# Patient Record
Sex: FEMALE | Race: WHITE | Employment: OTHER | URBAN - METROPOLITAN AREA
[De-identification: names, ages, dates, MRNs, and addresses within clinical notes are randomized per-mention and may not be internally consistent; named-entity substitution may affect disease eponyms.]

---

## 2021-11-08 ENCOUNTER — TELEPHONE (OUTPATIENT)
Dept: GASTROENTEROLOGY | Age: 86
End: 2021-11-08

## 2021-11-08 NOTE — TELEPHONE ENCOUNTER
Pt's PCP called to see if we had any doctors that would see a pt from a referral. PCP stated they have called other GI offices, and nothing is available until after the 1st of the year. I let the PCP know we have openings for NP. PCP is faxing a referral over to us so the pt can be ankit ASAP.

## 2021-11-09 ENCOUNTER — TELEPHONE (OUTPATIENT)
Dept: GASTROENTEROLOGY | Age: 86
End: 2021-11-09

## 2021-11-10 NOTE — TELEPHONE ENCOUNTER
Scheduled with the patient and daughter Avinash Barrera for 11/23/21 1:15 pm at the [facility] office. Writer thanked and call ended. Wanted to wait at least a week.

## 2021-11-23 ENCOUNTER — OFFICE VISIT (OUTPATIENT)
Dept: GASTROENTEROLOGY | Age: 86
End: 2021-11-23
Payer: MEDICARE

## 2021-11-23 ENCOUNTER — HOSPITAL ENCOUNTER (OUTPATIENT)
Age: 86
Setting detail: SPECIMEN
Discharge: HOME | End: 2021-11-23

## 2021-11-23 VITALS — SYSTOLIC BLOOD PRESSURE: 117 MMHG | DIASTOLIC BLOOD PRESSURE: 62 MMHG | WEIGHT: 164 LBS

## 2021-11-23 DIAGNOSIS — R63.4 WEIGHT LOSS: Primary | ICD-10-CM

## 2021-11-23 DIAGNOSIS — R63.4 WEIGHT LOSS: ICD-10-CM

## 2021-11-23 DIAGNOSIS — R97.8 OTHER ABNORMAL TUMOR MARKERS: ICD-10-CM

## 2021-11-23 DIAGNOSIS — R11.0 NAUSEA: ICD-10-CM

## 2021-11-23 DIAGNOSIS — M83.2 ADULT OSTEOMALACIA DUE TO MALABSORPTION: ICD-10-CM

## 2021-11-23 DIAGNOSIS — R79.9 ABNORMAL FINDING OF BLOOD CHEMISTRY, UNSPECIFIED: ICD-10-CM

## 2021-11-23 PROBLEM — I35.0 AORTIC STENOSIS: Status: ACTIVE | Noted: 2021-11-23

## 2021-11-23 PROBLEM — Z95.2 HISTORY OF AORTIC VALVE REPLACEMENT: Status: ACTIVE | Noted: 2021-11-23

## 2021-11-23 PROBLEM — R42 DIZZINESS: Status: ACTIVE | Noted: 2020-01-28

## 2021-11-23 PROBLEM — I48.91 ATRIAL FIBRILLATION (HCC): Status: ACTIVE | Noted: 2019-07-22

## 2021-11-23 PROBLEM — I25.10 CORONARY ATHEROSCLEROSIS: Status: ACTIVE | Noted: 2021-11-23

## 2021-11-23 PROBLEM — E78.5 HYPERLIPIDEMIA: Status: ACTIVE | Noted: 2021-11-23

## 2021-11-23 PROBLEM — R09.89 CAROTID BRUIT: Status: ACTIVE | Noted: 2021-11-23

## 2021-11-23 LAB
ABSOLUTE EOS #: 0.18 K/UL (ref 0–0.44)
ABSOLUTE IMMATURE GRANULOCYTE: 0.04 K/UL (ref 0–0.3)
ABSOLUTE LYMPH #: 2.1 K/UL (ref 1.1–3.7)
ABSOLUTE MONO #: 0.81 K/UL (ref 0.1–1.2)
BASOPHILS # BLD: 1 % (ref 0–2)
BASOPHILS ABSOLUTE: 0.06 K/UL (ref 0–0.2)
CA 19-9: 22 U/ML (ref 0–35)
DIFFERENTIAL TYPE: ABNORMAL
EOSINOPHILS RELATIVE PERCENT: 2 % (ref 1–4)
FOLATE: 16.6 NG/ML
HCT VFR BLD CALC: 36.5 % (ref 36.3–47.1)
HEMOGLOBIN: 11.4 G/DL (ref 11.9–15.1)
IMMATURE GRANULOCYTES: 0 %
IRON SATURATION: 27 % (ref 20–55)
IRON: 74 UG/DL (ref 37–145)
LYMPHOCYTES # BLD: 23 % (ref 24–43)
MCH RBC QN AUTO: 31.3 PG (ref 25.2–33.5)
MCHC RBC AUTO-ENTMCNC: 31.2 G/DL (ref 28.4–34.8)
MCV RBC AUTO: 100.3 FL (ref 82.6–102.9)
MONOCYTES # BLD: 9 % (ref 3–12)
NRBC AUTOMATED: 0 PER 100 WBC
PDW BLD-RTO: 14.1 % (ref 11.8–14.4)
PLATELET # BLD: 247 K/UL (ref 138–453)
PLATELET ESTIMATE: ABNORMAL
PMV BLD AUTO: 11 FL (ref 8.1–13.5)
RBC # BLD: 3.64 M/UL (ref 3.95–5.11)
RBC # BLD: ABNORMAL 10*6/UL
SEG NEUTROPHILS: 65 % (ref 36–65)
SEGMENTED NEUTROPHILS ABSOLUTE COUNT: 5.86 K/UL (ref 1.5–8.1)
TOTAL IRON BINDING CAPACITY: 279 UG/DL (ref 250–450)
UNSATURATED IRON BINDING CAPACITY: 205 UG/DL (ref 112–347)
VITAMIN B-12: 413 PG/ML (ref 232–1245)
VITAMIN D 25-HYDROXY: 24.7 NG/ML (ref 30–100)
WBC # BLD: 9.1 K/UL (ref 3.5–11.3)
WBC # BLD: ABNORMAL 10*3/UL

## 2021-11-23 PROCEDURE — G8421 BMI NOT CALCULATED: HCPCS | Performed by: INTERNAL MEDICINE

## 2021-11-23 PROCEDURE — 1090F PRES/ABSN URINE INCON ASSESS: CPT | Performed by: INTERNAL MEDICINE

## 2021-11-23 PROCEDURE — 4040F PNEUMOC VAC/ADMIN/RCVD: CPT | Performed by: INTERNAL MEDICINE

## 2021-11-23 PROCEDURE — G8427 DOCREV CUR MEDS BY ELIG CLIN: HCPCS | Performed by: INTERNAL MEDICINE

## 2021-11-23 PROCEDURE — 1036F TOBACCO NON-USER: CPT | Performed by: INTERNAL MEDICINE

## 2021-11-23 PROCEDURE — 99203 OFFICE O/P NEW LOW 30 MIN: CPT | Performed by: INTERNAL MEDICINE

## 2021-11-23 PROCEDURE — G8484 FLU IMMUNIZE NO ADMIN: HCPCS | Performed by: INTERNAL MEDICINE

## 2021-11-23 PROCEDURE — 1123F ACP DISCUSS/DSCN MKR DOCD: CPT | Performed by: INTERNAL MEDICINE

## 2021-11-23 RX ORDER — METOPROLOL TARTRATE 50 MG/1
50 TABLET, FILM COATED ORAL 2 TIMES DAILY
COMMUNITY
Start: 2021-08-31

## 2021-11-23 RX ORDER — ASPIRIN 81 MG/1
TABLET ORAL DAILY
COMMUNITY

## 2021-11-23 RX ORDER — DOCUSATE SODIUM 100 MG/1
100 CAPSULE, LIQUID FILLED ORAL 2 TIMES DAILY
Qty: 60 CAPSULE | Refills: 0 | Status: SHIPPED | OUTPATIENT
Start: 2021-11-23 | End: 2021-12-22

## 2021-11-23 RX ORDER — FUROSEMIDE 40 MG/1
TABLET ORAL
COMMUNITY
Start: 2021-09-13

## 2021-11-23 RX ORDER — ROSUVASTATIN CALCIUM 10 MG/1
TABLET, COATED ORAL
COMMUNITY
Start: 2021-09-13

## 2021-11-23 ASSESSMENT — ENCOUNTER SYMPTOMS
CONSTIPATION: 1
NAUSEA: 1
RESPIRATORY NEGATIVE: 1
ALLERGIC/IMMUNOLOGIC NEGATIVE: 1
EYES NEGATIVE: 1

## 2021-11-23 NOTE — PROGRESS NOTES
Reason for Referral:  Unintentional weight loss      Alessandro Walker, DO  [address] [facility],  [address]    Chief Complaint   Patient presents with    New Patient     referred for weight loss    Weight Loss     Patient weight loss occured 2 months ago. 7-10 pounds. States she currently has normal appetite. States she has troubles chewing- dental problems. Denies abdominal pain.  Constipation     Currently having a bowel movement every 3-5 days. States this is not normal for her. Formed stools. No black stools.  Nausea     Feels nauseous in the morning when she wakes up. Denies GERD symptoms. HISTORY OF PRESENT ILLNESS: Sanchez Mazariegos is a 80 y.o. female with a past history remarkable for HTN, HL, DM, controlled, type II, CAD s/p CABG, valve replacement, on eliquis, referred for evaluation of unintentional weight loss. Macular degeneration addition for evaluation. Over the course of several weeks the patient reported change in caliber stools with a bowel movements occurring every 1 to 2 days. The patient reports thinner stools that appear to be unusual.  No prior colonoscopy reported by the patient. No recent upper endoscopy reported by the patient. Denies any significant changes in appetite. No dysphagia symptoms reported by the patient. Patient underwent a CT abdomen pelvis by outlying facility where the patient was identified to have pancreatic lesion at the uncinate process. Referred to GI for further evaluation. Smoker: None   Drinking history: None   Abdominal surgeries: none   Prior Colonoscopy: never had colonoscopy   Prior EGD: none   FH of GI issues:  with Colon Ca---       Past Medical,Family, and Social History reviewed and does contribute to the patient presentingcondition. Patient's PMH/PSH,SH,PSYCH Hx, MEDs, ALLERGIES, and ROS were all reviewed and updated in the appropriate sections. PAST MEDICAL HISTORY:  History reviewed.  No pertinent past medical history. History reviewed. No pertinent surgical history. CURRENT MEDICATIONS:    Current Outpatient Medications:     apixaban (ELIQUIS) 5 MG TABS tablet, Take 5 mg by mouth 2 times daily, Disp: , Rfl:     furosemide (LASIX) 40 MG tablet, , Disp: , Rfl:     metoprolol tartrate (LOPRESSOR) 50 MG tablet, Take 50 mg by mouth 2 times daily, Disp: , Rfl:     rosuvastatin (CRESTOR) 10 MG tablet, , Disp: , Rfl:     aspirin 81 MG EC tablet, Take by mouth daily, Disp: , Rfl:     ALLERGIES:   Allergies   Allergen Reactions    Ace Inhibitors      Other reaction(s): Cough  Other reaction(s): Captopril-cough      Atorvastatin      Other reaction(s): Lipitor-muscle aches    Morphine      Other reaction(s): Intolerance-unknown    Timolol Maleate      Other reaction(s): Intolerance-unknown       FAMILY HISTORY: History reviewed. No pertinent family history. SOCIAL HISTORY:   Social History     Socioeconomic History    Marital status:      Spouse name: Not on file    Number of children: Not on file    Years of education: Not on file    Highest education level: Not on file   Occupational History    Not on file   Tobacco Use    Smoking status: Never Smoker    Smokeless tobacco: Never Used   Substance and Sexual Activity    Alcohol use: Not Currently    Drug use: Not Currently    Sexual activity: Not on file   Other Topics Concern    Not on file   Social History Narrative    Not on file     Social Determinants of Health     Financial Resource Strain:     Difficulty of Paying Living Expenses: Not on file   Food Insecurity:     Worried About Running Out of Food in the Last Year: Not on file    Manpreet of Food in the Last Year: Not on file   Transportation Needs:     Lack of Transportation (Medical): Not on file    Lack of Transportation (Non-Medical):  Not on file   Physical Activity:     Days of Exercise per Week: Not on file    Minutes of Exercise per Session: Not on file   Stress:     Feeling of Stress : Not on file   Social Connections:     Frequency of Communication with Friends and Family: Not on file    Frequency of Social Gatherings with Friends and Family: Not on file    Attends Jewish Services: Not on file    Active Member of Clubs or Organizations: Not on file    Attends Club or Organization Meetings: Not on file    Marital Status: Not on file   Intimate Partner Violence:     Fear of Current or Ex-Partner: Not on file    Emotionally Abused: Not on file    Physically Abused: Not on file    Sexually Abused: Not on file   Housing Stability:     Unable to Pay for Housing in the Last Year: Not on file    Number of [place] in the Last Year: Not on file    Unstable Housing in the Last Year: Not on file         REVIEW OF SYSTEMS: A 12-point review of systems was obtained and pertinent positives and negatives were listed below. REVIEW OF SYSTEMS:     Constitutional: No fever, no chills, no lethargy, no weakness. HEENT:  No headache, otalgia, itchy eyes, nasal discharge or sore throat. Cardiac:  No chest pain, dyspnea, orthopnea or PND. Chest:   No cough, phlegm or wheezing. Abdomen:      Detailed by MA   Neuro:  No focal weakness, abnormal movements or seizure like activity. Skin:   No rashes, no itching. :   No hematuria, no pyuria, no dysuria, no flank pain. Extremities:  No swelling or joint pains. ROS was otherwise negative    Review of Systems   Constitutional: Positive for unexpected weight change. HENT: Negative. Eyes: Negative. Respiratory: Negative. Cardiovascular: Negative. Gastrointestinal: Positive for constipation and nausea. Endocrine: Negative. Genitourinary: Negative. Musculoskeletal: Positive for gait problem. Skin: Negative. Allergic/Immunologic: Negative. Hematological: Bruises/bleeds easily. Psychiatric/Behavioral: Negative. All other systems reviewed and are negative.       PHYSICAL EXAMINATION: Vital signs reviewed per the nursing documentation. /62   Wt 164 lb (74.4 kg)   There is no height or weight on file to calculate BMI. Physical Exam    Physical Exam   Constitutional: Patient is oriented to person, place, and time. Patient appears well-developed and well-nourished. HENT:   Head: Normocephalic and atraumatic. Eyes: Pupils are equal, round, and reactive to light. EOM are normal.   Neck: Normal range of motion. Neck supple. No JVD present. No tracheal deviation present. No thyromegaly present. Cardiovascular: Normal rate, regular rhythm, normal heart sounds and intact distal pulses. Pulmonary/Chest: Effort normal and breath sounds normal. No stridor. No respiratory distress. He has no wheezes. He has no rales. He exhibits no tenderness. Abdominal: Soft. Bowel sounds are normal. He exhibits no distension and no mass. There is no tenderness. There is no rebound and no guarding. No hernia. Musculoskeletal: Normal range of motion. Lymphadenopathy:    Patient has no cervical adenopathy. Neurological: Patient is alert and oriented to person, place, and time. Psychiatric: Patient has a normal mood and affect. Patient behavior is normal.       LABORATORY DATA: Reviewed  No results found for: WBC, HGB, HCT, MCV, PLT, NA, K, CL, CO2, BUN, CREATININE, LABPROT, LABALBU, GGT, BILITOT, ALKPHOS, AST, ALT, INR      No results found for: RBC, HGB, MCV, MCH, MCHC, RDW, MPV, BASOPCT, LYMPHSABS, MONOSABS, NEUTROABS, EOSABS, BASOSABS      DIAGNOSTIC TESTING:     No results found. IMPRESSION:  Jessica Rogers is a 80 y.o. female with a past history remarkable for HTN, HL, DM, controlled, type II, CAD s/p CABG, valve replacement, on eliquis, referred for evaluation of unintentional weight loss. Macular degeneration addition for evaluation. Over the course of several weeks the patient reported change in caliber stools with a bowel movements occurring every 1 to 2 days.   The patient reports thinner stools that appear to be unusual.  No prior colonoscopy reported by the patient. No recent upper endoscopy reported by the patient. Denies any significant changes in appetite. No dysphagia symptoms reported by the patient. Patient underwent a CT abdomen pelvis by outlying facility where the patient was identified to have pancreatic lesion at the uncinate process. Referred to GI for further evaluation. Assessment  1. Weight loss    2. Nausea        PLAN:    1) pancreatic lesion measuring approximately 1.5 cm at the uncinate process-no mention of pancreatic duct dilatation or biliary ductal dilatation. Will send tumor markers including CA 19-9, and will coordinate an endoscopic ultrasound. Will need to obtain images from outlying facility to review. 2) caloric and nutritional optimization recommended-we will send for vitamin serologies, vitamin D, B12, folate, iron levels. Multivitamins and vitamin D supplementation recommended    3) we will coordinate endoscopic ultrasound to further evaluate lesion. Follow-up after procedure. Thank you for allowing me to participate in the care of Ms. Drew Manley. For any further questions please do not hesitate to contact me. I have reviewed and agree with the MA/LPN ROS please refer to their documentation from today's encounter on a separate note. Billy Gordon MD, MPH   [facility]  Office #: [phone]          this note is created with the assistance of a speech recognition program.  While intending to generate a document that actually reflects the content of the visit, the document can still have some errors including those of syntax and sound a like substitutions which may escape proof reading. It such instances, actual meaning can be extrapolated by contextual diversion.

## 2021-12-06 ENCOUNTER — PATIENT MESSAGE (OUTPATIENT)
Dept: GASTROENTEROLOGY | Age: 86
End: 2021-12-06

## 2021-12-06 ENCOUNTER — TELEPHONE (OUTPATIENT)
Dept: GASTROENTEROLOGY | Age: 86
End: 2021-12-06

## 2021-12-06 DIAGNOSIS — K86.9 PANCREATIC LESION: Primary | ICD-10-CM

## 2021-12-06 RX ORDER — ONDANSETRON 4 MG/1
4 TABLET, ORALLY DISINTEGRATING ORAL EVERY 8 HOURS PRN
Qty: 30 TABLET | Refills: 0 | Status: SHIPPED | OUTPATIENT
Start: 2021-12-06 | End: 2021-12-07 | Stop reason: SDUPTHER

## 2021-12-06 NOTE — TELEPHONE ENCOUNTER
Called pt; spoke to Zeinab Hayes, daughter. Pt now scheduled for STV 12/16/21 at 1130am, EUS with Dr Mary Jo Taylor, vacc, em, clearance sent to Dr Opal Vilchis for eliquis. Mayank Rodriguez, pt's f/u appt with Dr Saleem Martinez is 12/14/21, 2 days after Dr Grant Marques first availability for EUS. Daughter would like to know if pt should keep the 12/14/21 appt.

## 2021-12-07 DIAGNOSIS — R11.0 NAUSEA: Primary | ICD-10-CM

## 2021-12-07 RX ORDER — ONDANSETRON 4 MG/1
4 TABLET, ORALLY DISINTEGRATING ORAL EVERY 8 HOURS PRN
Qty: 30 TABLET | Refills: 0 | Status: SHIPPED | OUTPATIENT
Start: 2021-12-07

## 2021-12-16 ENCOUNTER — ANESTHESIA (OUTPATIENT)
Dept: ENDOSCOPY | Age: 86
End: 2021-12-16
Payer: MEDICARE

## 2021-12-16 ENCOUNTER — ANESTHESIA EVENT (OUTPATIENT)
Dept: ENDOSCOPY | Age: 86
End: 2021-12-16
Payer: MEDICARE

## 2021-12-16 ENCOUNTER — HOSPITAL ENCOUNTER (OUTPATIENT)
Age: 86
Setting detail: OUTPATIENT SURGERY
Discharge: HOME | End: 2021-12-16
Attending: INTERNAL MEDICINE | Admitting: INTERNAL MEDICINE
Payer: MEDICARE

## 2021-12-16 ENCOUNTER — HOSPITAL ENCOUNTER (OUTPATIENT)
Dept: ULTRASOUND IMAGING | Age: 86
Discharge: HOME | End: 2021-12-18
Attending: INTERNAL MEDICINE
Payer: MEDICARE

## 2021-12-16 VITALS
DIASTOLIC BLOOD PRESSURE: 34 MMHG | OXYGEN SATURATION: 100 % | SYSTOLIC BLOOD PRESSURE: 112 MMHG | RESPIRATION RATE: 30 BRPM

## 2021-12-16 VITALS
BODY MASS INDEX: 25.71 KG/M2 | HEIGHT: 66 IN | DIASTOLIC BLOOD PRESSURE: 53 MMHG | TEMPERATURE: 97.3 F | SYSTOLIC BLOOD PRESSURE: 141 MMHG | RESPIRATION RATE: 22 BRPM | HEART RATE: 76 BPM | OXYGEN SATURATION: 100 % | WEIGHT: 160 LBS

## 2021-12-16 DIAGNOSIS — R10.84 ABDOMINAL PAIN, GENERALIZED: ICD-10-CM

## 2021-12-16 PROCEDURE — 43259 EGD US EXAM DUODENUM/JEJUNUM: CPT | Performed by: INTERNAL MEDICINE

## 2021-12-16 PROCEDURE — 3700000001 HC ADD 15 MINUTES (ANESTHESIA): Performed by: INTERNAL MEDICINE

## 2021-12-16 PROCEDURE — 2580000003 HC RX 258: Performed by: INTERNAL MEDICINE

## 2021-12-16 PROCEDURE — 2709999900 HC NON-CHARGEABLE SUPPLY: Performed by: INTERNAL MEDICINE

## 2021-12-16 PROCEDURE — 2500000003 HC RX 250 WO HCPCS: Performed by: SPECIALIST

## 2021-12-16 PROCEDURE — 7100000011 HC PHASE II RECOVERY - ADDTL 15 MIN: Performed by: INTERNAL MEDICINE

## 2021-12-16 PROCEDURE — 3609018500 HC EGD US SCOPE W/ADJACENT STRUCTURES: Performed by: INTERNAL MEDICINE

## 2021-12-16 PROCEDURE — 3700000000 HC ANESTHESIA ATTENDED CARE: Performed by: INTERNAL MEDICINE

## 2021-12-16 PROCEDURE — 7100000010 HC PHASE II RECOVERY - FIRST 15 MIN: Performed by: INTERNAL MEDICINE

## 2021-12-16 PROCEDURE — 6360000002 HC RX W HCPCS: Performed by: SPECIALIST

## 2021-12-16 PROCEDURE — 76975 GI ENDOSCOPIC ULTRASOUND: CPT

## 2021-12-16 RX ORDER — PROPOFOL 10 MG/ML
INJECTION, EMULSION INTRAVENOUS CONTINUOUS PRN
Status: DISCONTINUED | OUTPATIENT
Start: 2021-12-16 | End: 2021-12-16 | Stop reason: SDUPTHER

## 2021-12-16 RX ORDER — SODIUM CHLORIDE 9 MG/ML
INJECTION, SOLUTION INTRAVENOUS CONTINUOUS
Status: DISCONTINUED | OUTPATIENT
Start: 2021-12-16 | End: 2021-12-16 | Stop reason: HOSPADM

## 2021-12-16 RX ORDER — LIDOCAINE HYDROCHLORIDE 10 MG/ML
INJECTION, SOLUTION EPIDURAL; INFILTRATION; INTRACAUDAL; PERINEURAL PRN
Status: DISCONTINUED | OUTPATIENT
Start: 2021-12-16 | End: 2021-12-16 | Stop reason: SDUPTHER

## 2021-12-16 RX ADMIN — PROPOFOL 80 MCG/KG/MIN: 10 INJECTION, EMULSION INTRAVENOUS at 13:23

## 2021-12-16 RX ADMIN — LIDOCAINE HYDROCHLORIDE 30 MG: 10 INJECTION, SOLUTION EPIDURAL; INFILTRATION; INTRACAUDAL; PERINEURAL at 13:23

## 2021-12-16 RX ADMIN — SODIUM CHLORIDE: 9 INJECTION, SOLUTION INTRAVENOUS at 11:39

## 2021-12-16 ASSESSMENT — PAIN SCALES - GENERAL: PAINLEVEL_OUTOF10: 0

## 2021-12-16 ASSESSMENT — PAIN - FUNCTIONAL ASSESSMENT: PAIN_FUNCTIONAL_ASSESSMENT: 0-10

## 2021-12-16 NOTE — OP NOTE
EGD/EUS      Patient:   Solomon Shah    :    1926    Facility:   [address] [place] [address]   Referring/PCP: Arnav Lance DO    Procedure:   Endoscopic ultrasound   Date:     2021   Endoscopist:  Cece Cisse MD, [facility]    Indication:   Abnormal CT showing cyst in the pancreas    Procedure diagnosis: Cyst in the uncinate process without any worrisome features, common bile duct stone, Schatzki ring, hiatal hernia    Anesthesia:  MAC    Complications: None    Specimen: None    Description of Procedure:  Informed consent was obtained from the patient after explanation of the procedure including indications, description of the procedure,  benefits and possible risks and complications of the procedure, and alternatives. Questions were answered. The patient's history was reviewed and a directed physical examination was performed prior to the procedure. Patient was monitored throughout the procedure with pulse oximetry and periodic assessment of vital signs. Patient was sedated as noted above. With the patient in the left lateral decubitus position, the Olympus videoendoscope followed by endoechoendoscope was placed in the patient's mouth and under direct visualization passed into the esophagus. The scope was passed to the 2nd portion of the duodenum. The patient tolerated the procedure well and was taken to the recovery area in good condition. Estimated blood loss was none. EGD Findings[de-identified]   Esophagus: There was a nonobstructive Schatzki ring in the lower esophagus. The esophagus otherwise appeared normal.  Stomach: Stomach had small sliding hiatal hernia. Stomach was otherwise normal.  Duodenum: normal    EUS findings:  Pancreas: There was an anechoic cyst in the uncinate process measuring about 9 mm in diameter without any worrisome features. The pancreas otherwise appeared normal. Normal PD. No signs of chronic pancreatitis.    CBD: There was a hyperechoic stone with shadowing in the distal common bile duct measuring 8 mm in diameter. The rest of the common bile duct otherwise appeared normal.  CBD diameter was 7 mm. Gallbladder: Gallbladder had at least 1 hyperechoic stone with shadowing. Ampulla: Normal.  Left lobe of liver: normal.   Lymphadenopathy: No pathologic lymphadenopathy seen in upper abdomen. Recommendations: Schedule for ERCP for stone extraction. No further work-up is required for pancreatic cyst given the patient's age.     Electronically signed by Baldo Flor MD, FACG  on 12/16/2021 at 2:11 PM

## 2021-12-16 NOTE — ANESTHESIA POSTPROCEDURE EVALUATION
Department of Anesthesiology  Postprocedure Note    Patient: Pj Cooper  MRN: [id number]  YOB: 1926  Date of evaluation: 12/16/2021  Time:  3:34 PM     Procedure Summary     Date: 12/16/21 Room / Location: [address] [place] [address]    Anesthesia Start: 6808 Anesthesia Stop: 0144    Procedure: ENDOSCOPIC ULTRASOUND (N/A ) Diagnosis: (PANCREATIC LESION)    Surgeons: Carter Talbot MD Responsible Provider: Bhavana Lay MD    Anesthesia Type: MAC, TIVA ASA Status: 3          Anesthesia Type: MAC, TIVA    Ruiz Phase I: Ruiz Score: 10    Ruiz Phase II: Ruiz Score: 10    Last vitals: Reviewed and per EMR flowsheets.        Anesthesia Post Evaluation    Patient location during evaluation: PACU  Patient participation: complete - patient participated  Level of consciousness: awake and alert  Pain score: 0  Airway patency: patent  Nausea & Vomiting: no nausea and no vomiting  Complications: no  Cardiovascular status: hemodynamically stable  Respiratory status: room air  Hydration status: euvolemic

## 2021-12-16 NOTE — H&P
History and Physical    Pt Name: Joshua Griffin  MRN: [id number]  YOB: 1926  Date of evaluation: 12/16/2021  Primary Care Physician: Rachna Guerra DO    SUBJECTIVE:   History of Chief Complaint:    Joshua Griffin is a 80 y.o. female who is scheduled today for EUS. She is present with her daughter today. Pt complains of \"I have nausea\" and reportedly has 15 lb unintentional weight loss in 4 months, and that CT imaging revealed a pancreatic lesion. Allergies  is allergic to ace inhibitors, atorvastatin, morphine, and timolol maleate. Medications  Prior to Admission medications    Medication Sig Start Date End Date Taking? Authorizing Provider   metFORMIN (GLUCOPHAGE) 500 MG tablet Take 500 mg by mouth 2 times daily (with meals)   Yes Historical Provider, MD   ondansetron (ZOFRAN ODT) 4 MG disintegrating tablet Place 1 tablet under the tongue every 8 hours as needed for Nausea or Vomiting 12/7/21  Yes Debbie Conrad MD   apixaban (ELIQUIS) 5 MG TABS tablet Take 5 mg by mouth 2 times daily 3/19/21  Yes Historical Provider, MD   furosemide (LASIX) 40 MG tablet  9/13/21  Yes Historical Provider, MD   metoprolol tartrate (LOPRESSOR) 50 MG tablet Take 50 mg by mouth 2 times daily 8/31/21  Yes Historical Provider, MD   rosuvastatin (CRESTOR) 10 MG tablet  9/13/21  Yes Historical Provider, MD   aspirin 81 MG EC tablet Take by mouth daily   Yes Historical Provider, MD   docusate sodium (COLACE) 100 MG capsule Take 1 capsule by mouth 2 times daily 11/23/21 12/23/21 Yes Debbie Conrad MD     Past Medical History    has a past medical history of Aortic stenosis, Aortic valve replaced, Atrial fibrillation ([address] CAD (coronary artery disease), Carotid artery stenosis, COVID-19, Hyperlipidemia, Type 2 diabetes mellitus ([address] Wears glasses, and Wears hearing aid.   Past Surgical History   has a past surgical history that includes Aortic valve replacement (2001); fracture surgery; Coronary angioplasty with stent (2007); and Coronary artery bypass graft (2001). Social History   reports that she has never smoked. She has never used smokeless tobacco.   reports previous alcohol use. reports previous drug use. Marital Status  last month, had been  for 68 years  [de-identified] 5  Occupation retired nurse. Review of Systems:  CONSTITUTIONAL:   negative for fevers, chills, fatigue and malaise    EYES:   negative for double vision, blurred vision and photophobia +glasses   HEENT:   negative for tinnitus, epistaxis and sore throat  +hearing loss   RESPIRATORY:   negative for cough, shortness of breath, wheezing     CARDIOVASCULAR:   negative for chest pain, palpitations, syncope, edema     GASTROINTESTINAL:   See HPI   GENITOURINARY:   negative for incontinence     MUSCULOSKELETAL:   negative for neck or back pain     NEUROLOGICAL:   Negative for weakness and tingling  negative for headaches and dizziness     PSYCHIATRIC:   negative for anxiety       OBJECTIVE:   VITALS:  height is 5' 6\" (1.676 m) and weight is 160 lb (72.6 kg). Her infrared temperature is 98.2 °F (36.8 °C). Her blood pressure is 117/77 and her pulse is 82. Her respiration is 20 and oxygen saturation is 96%. CONSTITUTIONAL:alert & oriented x 3, no acute distress. Calm and pleasant. SKIN:  Warm and dry, no rashes on exposed areas of skin   HEAD:  Normocephalic, atraumatic   EYES: PERRL. EOMs intact. Wearing glasses. EARS:  Equal bilaterally, no edema or thickening, skin is intact without lumps or lesions. No discharge. Hearing loss mild. NOSE:  Nares patent. No rhinorrhea   MOUTH/THROAT:  Mucous membranes moist, tongue is pink, uvula midline, teeth appear to be intact  NECK:supple, full ROM  LUNGS: Respirations even and non-labored. Clear to auscultation bilaterally, no wheezes, rales, or rhonchi. CARDIOVASCULAR: Regular rate and rhythm, no murmurs/rubs/gallops, s/p valve replacement.    ABDOMEN: soft, non-tender, non-distended, bowel sounds active x 4 EXTREMITIES: No edema bilateral lower extremities. No varicosities bilateral lower extremities. NEUROLOGIC: CN II-XII are grossly intact. Gait not assessed.    IMPRESSIONS:   PANCREATIC LESION  PLANS:   KEVIN MILAN CNP   Electronically signed 12/16/2021 at 11:55 AM

## 2021-12-16 NOTE — H&P (VIEW-ONLY)
History and Physical    Pt Name: Harjinder Veliz  MRN: [id number]  YOB: 1926  Date of evaluation: 12/16/2021  Primary Care Physician: Mona Kelsey DO    SUBJECTIVE:   History of Chief Complaint:    Harjinder Veliz is a 80 y.o. female who is scheduled today for EUS. She is present with her daughter today. Pt complains of \"I have nausea\" and reportedly has 15 lb unintentional weight loss in 4 months, and that CT imaging revealed a pancreatic lesion. Allergies  is allergic to ace inhibitors, atorvastatin, morphine, and timolol maleate. Medications  Prior to Admission medications    Medication Sig Start Date End Date Taking? Authorizing Provider   metFORMIN (GLUCOPHAGE) 500 MG tablet Take 500 mg by mouth 2 times daily (with meals)   Yes Historical Provider, MD   ondansetron (ZOFRAN ODT) 4 MG disintegrating tablet Place 1 tablet under the tongue every 8 hours as needed for Nausea or Vomiting 12/7/21  Yes Summer Childs MD   apixaban (ELIQUIS) 5 MG TABS tablet Take 5 mg by mouth 2 times daily 3/19/21  Yes Historical Provider, MD   furosemide (LASIX) 40 MG tablet  9/13/21  Yes Historical Provider, MD   metoprolol tartrate (LOPRESSOR) 50 MG tablet Take 50 mg by mouth 2 times daily 8/31/21  Yes Historical Provider, MD   rosuvastatin (CRESTOR) 10 MG tablet  9/13/21  Yes Historical Provider, MD   aspirin 81 MG EC tablet Take by mouth daily   Yes Historical Provider, MD   docusate sodium (COLACE) 100 MG capsule Take 1 capsule by mouth 2 times daily 11/23/21 12/23/21 Yes Summer Childs MD     Past Medical History    has a past medical history of Aortic stenosis, Aortic valve replaced, Atrial fibrillation ([address] CAD (coronary artery disease), Carotid artery stenosis, COVID-19, Hyperlipidemia, Type 2 diabetes mellitus ([address] Wears glasses, and Wears hearing aid.   Past Surgical History   has a past surgical history that includes Aortic valve replacement (2001); fracture surgery; Coronary angioplasty with stent (2007); and Coronary artery bypass graft (2001). Social History   reports that she has never smoked. She has never used smokeless tobacco.   reports previous alcohol use. reports previous drug use. Marital Status  last month, had been  for 68 years  [de-identified] 5  Occupation retired nurse. Review of Systems:  CONSTITUTIONAL:   negative for fevers, chills, fatigue and malaise    EYES:   negative for double vision, blurred vision and photophobia +glasses   HEENT:   negative for tinnitus, epistaxis and sore throat  +hearing loss   RESPIRATORY:   negative for cough, shortness of breath, wheezing     CARDIOVASCULAR:   negative for chest pain, palpitations, syncope, edema     GASTROINTESTINAL:   See HPI   GENITOURINARY:   negative for incontinence     MUSCULOSKELETAL:   negative for neck or back pain     NEUROLOGICAL:   Negative for weakness and tingling  negative for headaches and dizziness     PSYCHIATRIC:   negative for anxiety       OBJECTIVE:   VITALS:  height is 5' 6\" (1.676 m) and weight is 160 lb (72.6 kg). Her infrared temperature is 98.2 °F (36.8 °C). Her blood pressure is 117/77 and her pulse is 82. Her respiration is 20 and oxygen saturation is 96%. CONSTITUTIONAL:alert & oriented x 3, no acute distress. Calm and pleasant. SKIN:  Warm and dry, no rashes on exposed areas of skin   HEAD:  Normocephalic, atraumatic   EYES: PERRL. EOMs intact. Wearing glasses. EARS:  Equal bilaterally, no edema or thickening, skin is intact without lumps or lesions. No discharge. Hearing loss mild. NOSE:  Nares patent. No rhinorrhea   MOUTH/THROAT:  Mucous membranes moist, tongue is pink, uvula midline, teeth appear to be intact  NECK:supple, full ROM  LUNGS: Respirations even and non-labored. Clear to auscultation bilaterally, no wheezes, rales, or rhonchi. CARDIOVASCULAR: Regular rate and rhythm, no murmurs/rubs/gallops, s/p valve replacement.    ABDOMEN: soft, non-tender, non-distended, bowel sounds active x 4 EXTREMITIES: No edema bilateral lower extremities. No varicosities bilateral lower extremities. NEUROLOGIC: CN II-XII are grossly intact. Gait not assessed.    IMPRESSIONS:   PANCREATIC LESION  PLANS:   KEVIN MILNA CNP   Electronically signed 12/16/2021 at 11:55 AM

## 2021-12-16 NOTE — ANESTHESIA PRE PROCEDURE
Department of Anesthesiology  Preprocedure Note       Name:  Arpan Barrios   Age:  80 y.o.  :  1926                                          MRN:  [id number]         Date:  2021      Surgeon: Alex Bowers):  Nan Gillette MD    Procedure: Procedure(s):  ENDOSCOPIC ULTRASOUND    Medications prior to admission:   Prior to Admission medications    Medication Sig Start Date End Date Taking? Authorizing Provider   metFORMIN (GLUCOPHAGE) 500 MG tablet Take 500 mg by mouth 2 times daily (with meals)   Yes Historical Provider, MD   ondansetron (ZOFRAN ODT) 4 MG disintegrating tablet Place 1 tablet under the tongue every 8 hours as needed for Nausea or Vomiting 21  Yes Lucio Donahue MD   apixaban (ELIQUIS) 5 MG TABS tablet Take 5 mg by mouth 2 times daily 3/19/21  Yes Historical Provider, MD   furosemide (LASIX) 40 MG tablet  21  Yes Historical Provider, MD   metoprolol tartrate (LOPRESSOR) 50 MG tablet Take 50 mg by mouth 2 times daily 21  Yes Historical Provider, MD   rosuvastatin (CRESTOR) 10 MG tablet  21  Yes Historical Provider, MD   aspirin 81 MG EC tablet Take by mouth daily   Yes Historical Provider, MD   docusate sodium (COLACE) 100 MG capsule Take 1 capsule by mouth 2 times daily 21 Yes Lucio Donahue MD       Current medications:    Current Facility-Administered Medications   Medication Dose Route Frequency Provider Last Rate Last Admin    0.9 % sodium chloride infusion   IntraVENous Continuous Nan Gillette MD 20 mL/hr at 21 1139 New Bag at 21 1139       Allergies: Allergies   Allergen Reactions    Ace Inhibitors      Other reaction(s): Cough  Other reaction(s): Captopril-cough      Atorvastatin      Other reaction(s): Lipitor-muscle aches    Morphine      Other reaction(s): Intolerance-unknown    Timolol Maleate      Other reaction(s):  Intolerance-unknown       Problem List:    Patient Active Problem List   Diagnosis Code    Coronary atherosclerosis I25.10    Atrial fibrillation (HCC) I48.91    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    Aortic stenosis I35.0    Carotid bruit R09.89    History of aortic valve replacement Z95.2    History of coronary artery stent placement Z95.5    Hyperlipidemia E78.5    Mitral valve disease I05.9    Dizziness R42       Past Medical History:        Diagnosis Date    Aortic stenosis     Aortic valve replaced     Atrial fibrillation (HCC)     CAD (coronary artery disease)     stents, [facility], notes in care everywhere    Carotid artery stenosis     COVID-19 11/2021    not hospitalized    Hyperlipidemia     Type 2 diabetes mellitus ([facility] [address]     Wears glasses     Wears hearing aid        Past Surgical History:        Procedure Laterality Date    AORTIC VALVE REPLACEMENT  2001    age 79   Nena Se CABG WITH AORTIC VALVE REPLACEMENT  2001    CORONARY ANGIOPLASTY WITH STENT PLACEMENT  2007    FRACTURE SURGERY      multiple over the years, ankle, wrist, shoulder       Social History:    Social History     Tobacco Use    Smoking status: Never Smoker    Smokeless tobacco: Never Used   Substance Use Topics    Alcohol use: Not Currently                                Counseling given: Not Answered      Vital Signs (Current):   Vitals:    12/16/21 1116   BP: 117/77   Pulse: 82   Resp: 20   Temp: 98.2 °F (36.8 °C)   TempSrc: Infrared   SpO2: 96%   Weight: 160 lb (72.6 kg)   Height: 5' 6\" (1.676 m)                                              BP Readings from Last 3 Encounters:   12/16/21 117/77   11/23/21 117/62       NPO Status: Time of last liquid consumption: 2100                        Time of last solid consumption: 1800                        Date of last liquid consumption: 12/16/21                        Date of last solid food consumption: 12/15/21    BMI:   Wt Readings from Last 3 Encounters:   12/16/21 160 lb (72.6 kg)   11/23/21 164 lb (74.4 kg)     Body mass index is 25.82 kg/m². CBC:   Lab Results   Component Value Date    WBC 9.1 11/23/2021    RBC 3.64 11/23/2021    HGB 11.4 11/23/2021    HCT 36.5 11/23/2021    .3 11/23/2021    RDW 14.1 11/23/2021     11/23/2021       CMP: No results found for: NA, K, CL, CO2, BUN, CREATININE, GFRAA, AGRATIO, LABGLOM, GLUCOSE, PROT, CALCIUM, BILITOT, ALKPHOS, AST, ALT    POC Tests: No results for input(s): POCGLU, POCNA, POCK, POCCL, POCBUN, POCHEMO, POCHCT in the last 72 hours. Coags: No results found for: PROTIME, INR, APTT    HCG (If Applicable): No results found for: PREGTESTUR, PREGSERUM, HCG, HCGQUANT     ABGs: No results found for: PHART, PO2ART, QBY1JRV, MUK2AOR, BEART, Z4NWWYDU     Type & Screen (If Applicable):  No results found for: LABABO, LABRH    Drug/Infectious Status (If Applicable):  No results found for: HIV, HEPCAB    COVID-19 Screening (If Applicable): No results found for: COVID19        Anesthesia Evaluation  Patient summary reviewed and Nursing notes reviewed no history of anesthetic complications:   Airway: Mallampati: II  TM distance: >3 FB   Neck ROM: full  Mouth opening: > = 3 FB Dental: normal exam   (+) caps      Pulmonary:normal exam    (+) recent URI:                             Cardiovascular:  Exercise tolerance: good (>4 METS),   (+) valvular problems/murmurs (s/p AVR):, CAD: non-obstructive, hyperlipidemia        Rhythm: regular  Rate: normal           Beta Blocker:  Dose within 24 Hrs         Neuro/Psych:   Negative Neuro/Psych ROS              GI/Hepatic/Renal: Neg GI/Hepatic/Renal ROS            Endo/Other:    (+) DiabetesType II DM, , .                 Abdominal:             Vascular: Other Findings:           Anesthesia Plan      MAC and TIVA     ASA 3       Induction: intravenous. MIPS: Postoperative opioids intended and Prophylactic antiemetics administered. Anesthetic plan and risks discussed with patient.       Plan discussed with CRNA and surgical team.                  Pablo Ren MD   12/16/2021

## 2021-12-22 ENCOUNTER — HOSPITAL ENCOUNTER (OUTPATIENT)
Age: 86
Discharge: HOME | End: 2021-12-22
Payer: MEDICARE

## 2021-12-22 ENCOUNTER — OFFICE VISIT (OUTPATIENT)
Dept: GASTROENTEROLOGY | Age: 86
End: 2021-12-22
Payer: MEDICARE

## 2021-12-22 ENCOUNTER — TELEPHONE (OUTPATIENT)
Dept: GASTROENTEROLOGY | Age: 86
End: 2021-12-22

## 2021-12-22 VITALS — WEIGHT: 160 LBS | SYSTOLIC BLOOD PRESSURE: 113 MMHG | BODY MASS INDEX: 25.82 KG/M2 | DIASTOLIC BLOOD PRESSURE: 64 MMHG

## 2021-12-22 DIAGNOSIS — R11.0 NAUSEA: Primary | ICD-10-CM

## 2021-12-22 DIAGNOSIS — R11.0 NAUSEA: ICD-10-CM

## 2021-12-22 DIAGNOSIS — R63.4 WEIGHT LOSS: ICD-10-CM

## 2021-12-22 DIAGNOSIS — K59.00 CONSTIPATION, UNSPECIFIED CONSTIPATION TYPE: ICD-10-CM

## 2021-12-22 LAB
ABSOLUTE EOS #: 0.1 K/UL (ref 0–0.44)
ABSOLUTE IMMATURE GRANULOCYTE: 0.04 K/UL (ref 0–0.3)
ABSOLUTE LYMPH #: 1.56 K/UL (ref 1.1–3.7)
ABSOLUTE MONO #: 0.62 K/UL (ref 0.1–1.2)
ALBUMIN SERPL-MCNC: 3.7 G/DL (ref 3.5–5.2)
ALBUMIN/GLOBULIN RATIO: 0.9 (ref 1–2.5)
ALP BLD-CCNC: 97 U/L (ref 35–104)
ALT SERPL-CCNC: 11 U/L (ref 5–33)
AST SERPL-CCNC: 14 U/L
BASOPHILS # BLD: 1 % (ref 0–2)
BASOPHILS ABSOLUTE: 0.07 K/UL (ref 0–0.2)
BILIRUB SERPL-MCNC: 0.19 MG/DL (ref 0.3–1.2)
BILIRUBIN DIRECT: <0.08 MG/DL
BILIRUBIN, INDIRECT: ABNORMAL MG/DL (ref 0–1)
DIFFERENTIAL TYPE: ABNORMAL
EOSINOPHILS RELATIVE PERCENT: 1 % (ref 1–4)
GLOBULIN: ABNORMAL G/DL (ref 1.5–3.8)
HCT VFR BLD CALC: 32.5 % (ref 36.3–47.1)
HEMOGLOBIN: 10.5 G/DL (ref 11.9–15.1)
IMMATURE GRANULOCYTES: 1 %
LYMPHOCYTES # BLD: 18 % (ref 24–43)
MCH RBC QN AUTO: 31.8 PG (ref 25.2–33.5)
MCHC RBC AUTO-ENTMCNC: 32.3 G/DL (ref 28.4–34.8)
MCV RBC AUTO: 98.5 FL (ref 82.6–102.9)
MONOCYTES # BLD: 7 % (ref 3–12)
NRBC AUTOMATED: 0 PER 100 WBC
PDW BLD-RTO: 14.4 % (ref 11.8–14.4)
PLATELET # BLD: 273 K/UL (ref 138–453)
PLATELET ESTIMATE: ABNORMAL
PMV BLD AUTO: 11 FL (ref 8.1–13.5)
RBC # BLD: 3.3 M/UL (ref 3.95–5.11)
RBC # BLD: ABNORMAL 10*6/UL
SEG NEUTROPHILS: 72 % (ref 36–65)
SEGMENTED NEUTROPHILS ABSOLUTE COUNT: 6.14 K/UL (ref 1.5–8.1)
TOTAL PROTEIN: 7.6 G/DL (ref 6.4–8.3)
WBC # BLD: 8.5 K/UL (ref 3.5–11.3)
WBC # BLD: ABNORMAL 10*3/UL

## 2021-12-22 PROCEDURE — G8419 CALC BMI OUT NRM PARAM NOF/U: HCPCS | Performed by: INTERNAL MEDICINE

## 2021-12-22 PROCEDURE — 1123F ACP DISCUSS/DSCN MKR DOCD: CPT | Performed by: INTERNAL MEDICINE

## 2021-12-22 PROCEDURE — 1090F PRES/ABSN URINE INCON ASSESS: CPT | Performed by: INTERNAL MEDICINE

## 2021-12-22 PROCEDURE — G8484 FLU IMMUNIZE NO ADMIN: HCPCS | Performed by: INTERNAL MEDICINE

## 2021-12-22 PROCEDURE — 4040F PNEUMOC VAC/ADMIN/RCVD: CPT | Performed by: INTERNAL MEDICINE

## 2021-12-22 PROCEDURE — 80076 HEPATIC FUNCTION PANEL: CPT

## 2021-12-22 PROCEDURE — 85025 COMPLETE CBC W/AUTO DIFF WBC: CPT

## 2021-12-22 PROCEDURE — 1036F TOBACCO NON-USER: CPT | Performed by: INTERNAL MEDICINE

## 2021-12-22 PROCEDURE — 99213 OFFICE O/P EST LOW 20 MIN: CPT | Performed by: INTERNAL MEDICINE

## 2021-12-22 PROCEDURE — G8427 DOCREV CUR MEDS BY ELIG CLIN: HCPCS | Performed by: INTERNAL MEDICINE

## 2021-12-22 PROCEDURE — 36415 COLL VENOUS BLD VENIPUNCTURE: CPT

## 2021-12-22 RX ORDER — POLYETHYLENE GLYCOL 3350 17 G/17G
17 POWDER, FOR SOLUTION ORAL DAILY
Qty: 476 G | Refills: 0 | COMMUNITY
Start: 2021-12-22 | End: 2022-01-21

## 2021-12-22 RX ORDER — DOCUSATE SODIUM 100 MG/1
CAPSULE, LIQUID FILLED ORAL
Qty: 60 CAPSULE | Refills: 0 | Status: SHIPPED | OUTPATIENT
Start: 2021-12-22

## 2021-12-22 ASSESSMENT — ENCOUNTER SYMPTOMS
RESPIRATORY NEGATIVE: 1
NAUSEA: 1
EYES NEGATIVE: 1
ABDOMINAL DISTENTION: 1
CONSTIPATION: 1
ALLERGIC/IMMUNOLOGIC NEGATIVE: 1

## 2021-12-22 NOTE — TELEPHONE ENCOUNTER
Writer unable to schedule procedure today for ERCP due to needing Eliquis clearance. Clearance sent to Dr Roseanna Hurtado. Patient needs to be schedule at [address] . Writer will await clearance and once received patient will be scheduled.

## 2021-12-22 NOTE — PROGRESS NOTES
GI FOLLOW UP    INTERVAL HISTORY:       Ongoing symptoms of constipation  EUS negative for worrisome pancreatic lesion, identified to have choledocholithiasis  CA 19-9 negative      Chief Complaint   Patient presents with    Follow-up     EUS follow up    Constipation     Patient having bowel movement every 5+ days. Last bowel movement was 8 days ago. Taking colace BID. States feeling nauseous and having abdominal discomfort. Rarely she will vomit. 1. Nausea    2. Weight loss    3. Constipation, unspecified constipation type          HISTORY OF PRESENT ILLNESS: Young Carry is a 80 y.o. female with a past history remarkable for HTN, HL, DM, controlled, type II, CAD s/p CABG, valve replacement, on eliquis, referred for evaluation of unintentional weight loss. Macular degeneration addition for evaluation. Over the course of several weeks the patient reported change in caliber stools with a bowel movements occurring every 1 to 2 days. The patient reports thinner stools that appear to be unusual.  No prior colonoscopy reported by the patient. No recent upper endoscopy reported by the patient. Denies any significant changes in appetite. No dysphagia symptoms reported by the patient. Patient underwent a CT abdomen pelvis by outlying facility where the patient was identified to have pancreatic lesion at the uncinate process. Referred to GI for further evaluation.     Smoker: None   Drinking history: None   Abdominal surgeries: none   Prior Colonoscopy: never had colonoscopy   Prior EGD: none   FH of GI issues:  with Colon Ca---     Past Medical,Family, and Social History reviewed and does contribute to the patient presenting condition. Patient's PMH/PSH,SH,PSYCH Hx, MEDs, ALLERGIES, and ROS were all reviewed and updated in the appropriate sections.     PAST MEDICAL HISTORY:  Past file.      SOCIAL HISTORY:   Social History     Socioeconomic History    Marital status:      Spouse name: Not on file    Number of children: Not on file    Years of education: Not on file    Highest education level: Not on file   Occupational History    Not on file   Tobacco Use    Smoking status: Never Smoker    Smokeless tobacco: Never Used   Substance and Sexual Activity    Alcohol use: Not Currently    Drug use: Not Currently    Sexual activity: Not on file   Other Topics Concern    Not on file   Social History Narrative    Not on file     Social Determinants of Health     Financial Resource Strain:     Difficulty of Paying Living Expenses: Not on file   Food Insecurity:     Worried About Running Out of Food in the Last Year: Not on file    Manpreet of Food in the Last Year: Not on file   Transportation Needs:     Lack of Transportation (Medical): Not on file    Lack of Transportation (Non-Medical):  Not on file   Physical Activity:     Days of Exercise per Week: Not on file    Minutes of Exercise per Session: Not on file   Stress:     Feeling of Stress : Not on file   Social Connections:     Frequency of Communication with Friends and Family: Not on file    Frequency of Social Gatherings with Friends and Family: Not on file    Attends Congregation Services: Not on file    Active Member of [address] [place] [address] [employer] or Organizations: Not on file    Attends Club or Organization Meetings: Not on file    Marital Status: Not on file   Intimate Partner Violence:     Fear of Current or Ex-Partner: Not on file    Emotionally Abused: Not on file    Physically Abused: Not on file    Sexually Abused: Not on file   Housing Stability:     Unable to Pay for Housing in the Last Year: Not on file    Number of [place] in the Last Year: Not on file    Unstable Housing in the Last Year: Not on file       REVIEW OF SYSTEMS: A 12-point review of systems was obtained and pertinent positives and negatives were listed below. REVIEW OF SYSTEMS:     Constitutional: No fever, no chills, no lethargy, no weakness. HEENT:  No headache, otalgia, itchy eyes, nasal discharge or sore throat. Cardiac:  No chest pain, dyspnea, orthopnea or PND. Chest:   No cough, phlegm or wheezing. Abdomen:      Detailed by MA   Neuro:  No focal weakness, abnormal movements or seizure like activity. Skin:   No rashes, no itching. :   No hematuria, no pyuria, no dysuria, no flank pain. Extremities:  No swelling or joint pains. ROS was otherwise negative    Review of Systems   Constitutional: Positive for unexpected weight change. HENT: Negative. Eyes: Negative. Respiratory: Negative. Cardiovascular: Negative. Gastrointestinal: Positive for abdominal distention, constipation and nausea. Endocrine: Negative. Genitourinary: Negative. Musculoskeletal: Positive for gait problem. Skin: Negative. Allergic/Immunologic: Negative. Hematological: Bruises/bleeds easily. Psychiatric/Behavioral: Negative. All other systems reviewed and are negative. PHYSICAL EXAMINATION: Vital signs reviewed per the nursing documentation. /64   Wt 160 lb (72.6 kg)   BMI 25.82 kg/m²   Body mass index is 25.82 kg/m². Physical Exam    Physical Exam   Constitutional: Patient is oriented to person, place, and time. Patient appears well-developed and well-nourished. HENT:   Head: Normocephalic and atraumatic. Eyes: Pupils are equal, round, and reactive to light. EOM are normal.   Neck: Normal range of motion. Neck supple. No JVD present. No tracheal deviation present. No thyromegaly present. Cardiovascular: Normal rate, regular rhythm, normal heart sounds and intact distal pulses. Pulmonary/Chest: Effort normal and breath sounds normal. No stridor. No respiratory distress. He has no wheezes. He has no rales. He exhibits no tenderness. Abdominal: Soft.  Bowel sounds are normal. He exhibits no distension and no mass. There is no tenderness. There is no rebound and no guarding. No hernia. Musculoskeletal: Normal range of motion. Lymphadenopathy:    Patient has no cervical adenopathy. Neurological: Patient is alert and oriented to person, place, and time. Psychiatric: Patient has a normal mood and affect. Patient behavior is normal.       LABORATORY DATA: Reviewed  Lab Results   Component Value Date    WBC 9.1 11/23/2021    HGB 11.4 (L) 11/23/2021    HCT 36.5 11/23/2021    .3 11/23/2021     11/23/2021         Lab Results   Component Value Date    RBC 3.64 (L) 11/23/2021    HGB 11.4 (L) 11/23/2021    .3 11/23/2021    MCH 31.3 11/23/2021    MCHC 31.2 11/23/2021    RDW 14.1 11/23/2021    MPV 11.0 11/23/2021    BASOPCT 1 11/23/2021    LYMPHSABS 2.10 11/23/2021    MONOSABS 0.81 11/23/2021    NEUTROABS 5.86 11/23/2021    EOSABS 0.18 11/23/2021    BASOSABS 0.06 11/23/2021         DIAGNOSTIC TESTING:     US GI ENDOSCOPIC S&I    Result Date: 12/16/2021  Radiology exam is complete. No Radiologist dictation. Please follow up with ordering provider. IMPRESSION: Sanchez Mazariegos is a 80 y.o. female with a past history remarkable for HTN, HL, DM, controlled, type II, CAD s/p CABG, valve replacement, on eliquis, referred for evaluation of unintentional weight loss. Macular degeneration addition for evaluation. Over the course of several weeks the patient reported change in caliber stools with a bowel movements occurring every 1 to 2 days. The patient reports thinner stools that appear to be unusual.  No prior colonoscopy reported by the patient. No recent upper endoscopy reported by the patient. Denies any significant changes in appetite. No dysphagia symptoms reported by the patient. Patient underwent a CT abdomen pelvis by outlying facility where the patient was identified to have pancreatic lesion at the uncinate process. Referred to GI for further evaluation. Assessment  1.  Nausea 2. Weight loss    3. 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 Patient denies any other colic-like symptoms. We will repeat LFTs. Schedule [facility] at [facility].     2) slow transit constipationno significant response with docusate on a milligrams twice daily. Will provide Miralax prn. 3) Blood work, clearance needed to hold eliquis, 3 days. Cr of 1.4. Follow-up after procedure. Risk, benefits, alternative discussed with the patient. She agreed to proceed with the procedure along with family. Thank you for allowing me to participate in the care of Ms. Harpreet Yu. For any further questions please do not hesitate to contact me. I have reviewed and agree with the ROS entered by the MA/LPN from today's encounter documented in a separate note. Lazarus Sayers MD, MPH   [facility]  Office #: [phone]    this note is created with the assistance of a speech recognition program.  While intending to generate a document that actually reflects the content of the visit, the document can still have some errors including those of syntax and sound a like substitutions which may escape proof reading. It such instances, actual meaning can be extrapolated by contextual diversion.

## 2021-12-28 NOTE — TELEPHONE ENCOUNTER
Clearance received to hold Eliquis x3day, Writer notify daughter regarding Eliquis clerance. Writer schedule procedure w/carmella daughter on Friday 1/14/22 with Dr Sharla Acosta at [facility].Daughter requested all ERCP instructions be mail to patient home address. All information mailed. Upon further review writer notice patient is currently taking ASA, will sent clearance for Aspirin.

## 2021-12-29 NOTE — TELEPHONE ENCOUNTER
Writer called Dr Suraj Caldwell office regarding ASA clearance, spoke with didier who will notify doctor to review.

## 2022-01-12 RX ORDER — CHLORAL HYDRATE 500 MG
CAPSULE ORAL
COMMUNITY

## 2022-01-13 ENCOUNTER — ANESTHESIA EVENT (OUTPATIENT)
Dept: OPERATING ROOM | Age: 87
End: 2022-01-13
Payer: MEDICARE

## 2022-01-14 ENCOUNTER — APPOINTMENT (OUTPATIENT)
Dept: GENERAL RADIOLOGY | Age: 87
End: 2022-01-14
Attending: INTERNAL MEDICINE
Payer: MEDICARE

## 2022-01-14 ENCOUNTER — ANESTHESIA (OUTPATIENT)
Dept: OPERATING ROOM | Age: 87
End: 2022-01-14
Payer: MEDICARE

## 2022-01-14 ENCOUNTER — HOSPITAL ENCOUNTER (OUTPATIENT)
Age: 87
Setting detail: OUTPATIENT SURGERY
Discharge: HOME | End: 2022-01-14
Attending: INTERNAL MEDICINE | Admitting: INTERNAL MEDICINE
Payer: MEDICARE

## 2022-01-14 VITALS
RESPIRATION RATE: 16 BRPM | WEIGHT: 160 LBS | SYSTOLIC BLOOD PRESSURE: 142 MMHG | HEART RATE: 74 BPM | BODY MASS INDEX: 25.71 KG/M2 | TEMPERATURE: 97.3 F | DIASTOLIC BLOOD PRESSURE: 81 MMHG | HEIGHT: 66 IN | OXYGEN SATURATION: 96 %

## 2022-01-14 VITALS — OXYGEN SATURATION: 100 % | SYSTOLIC BLOOD PRESSURE: 157 MMHG | TEMPERATURE: 97.3 F | DIASTOLIC BLOOD PRESSURE: 125 MMHG

## 2022-01-14 LAB
GFR NON-AFRICAN AMERICAN: 43 ML/MIN
GFR SERPL CREATININE-BSD FRML MDRD: 53 ML/MIN
GFR SERPL CREATININE-BSD FRML MDRD: ABNORMAL ML/MIN/{1.73_M2}
GLUCOSE BLD-MCNC: 143 MG/DL (ref 65–105)
GLUCOSE BLD-MCNC: 186 MG/DL (ref 74–100)
POC BUN: 31 MG/DL (ref 8–26)
POC CREATININE: 1.16 MG/DL (ref 0.51–1.19)

## 2022-01-14 PROCEDURE — 74328 X-RAY BILE DUCT ENDOSCOPY: CPT | Performed by: INTERNAL MEDICINE

## 2022-01-14 PROCEDURE — 7100000041 HC SPAR PHASE II RECOVERY - ADDTL 15 MIN: Performed by: INTERNAL MEDICINE

## 2022-01-14 PROCEDURE — 3609014900 HC ERCP W/SPHINCTEROTOMY &/OR PAPILLOTOMY: Performed by: INTERNAL MEDICINE

## 2022-01-14 PROCEDURE — 7100000001 HC PACU RECOVERY - ADDTL 15 MIN: Performed by: INTERNAL MEDICINE

## 2022-01-14 PROCEDURE — 2720000010 HC SURG SUPPLY STERILE: Performed by: INTERNAL MEDICINE

## 2022-01-14 PROCEDURE — 2709999900 HC NON-CHARGEABLE SUPPLY: Performed by: INTERNAL MEDICINE

## 2022-01-14 PROCEDURE — 2580000003 HC RX 258: Performed by: NURSE ANESTHETIST, CERTIFIED REGISTERED

## 2022-01-14 PROCEDURE — 3700000000 HC ANESTHESIA ATTENDED CARE: Performed by: INTERNAL MEDICINE

## 2022-01-14 PROCEDURE — 3700000001 HC ADD 15 MINUTES (ANESTHESIA): Performed by: INTERNAL MEDICINE

## 2022-01-14 PROCEDURE — C1769 GUIDE WIRE: HCPCS | Performed by: INTERNAL MEDICINE

## 2022-01-14 PROCEDURE — 3209999900 FLUORO FOR SURGICAL PROCEDURES

## 2022-01-14 PROCEDURE — 82565 ASSAY OF CREATININE: CPT

## 2022-01-14 PROCEDURE — 2500000003 HC RX 250 WO HCPCS: Performed by: NURSE ANESTHETIST, CERTIFIED REGISTERED

## 2022-01-14 PROCEDURE — 7100000040 HC SPAR PHASE II RECOVERY - FIRST 15 MIN: Performed by: INTERNAL MEDICINE

## 2022-01-14 PROCEDURE — 93005 ELECTROCARDIOGRAM TRACING: CPT | Performed by: ANESTHESIOLOGY

## 2022-01-14 PROCEDURE — 84520 ASSAY OF UREA NITROGEN: CPT

## 2022-01-14 PROCEDURE — 82947 ASSAY GLUCOSE BLOOD QUANT: CPT

## 2022-01-14 PROCEDURE — 43262 ENDO CHOLANGIOPANCREATOGRAPH: CPT | Performed by: INTERNAL MEDICINE

## 2022-01-14 PROCEDURE — 7100000000 HC PACU RECOVERY - FIRST 15 MIN: Performed by: INTERNAL MEDICINE

## 2022-01-14 PROCEDURE — 6360000002 HC RX W HCPCS: Performed by: NURSE ANESTHETIST, CERTIFIED REGISTERED

## 2022-01-14 RX ORDER — SODIUM CHLORIDE, SODIUM LACTATE, POTASSIUM CHLORIDE, CALCIUM CHLORIDE 600; 310; 30; 20 MG/100ML; MG/100ML; MG/100ML; MG/100ML
INJECTION, SOLUTION INTRAVENOUS CONTINUOUS PRN
Status: DISCONTINUED | OUTPATIENT
Start: 2022-01-14 | End: 2022-01-14 | Stop reason: SDUPTHER

## 2022-01-14 RX ORDER — ROCURONIUM BROMIDE 10 MG/ML
INJECTION, SOLUTION INTRAVENOUS PRN
Status: DISCONTINUED | OUTPATIENT
Start: 2022-01-14 | End: 2022-01-14 | Stop reason: SDUPTHER

## 2022-01-14 RX ORDER — LIDOCAINE HYDROCHLORIDE 10 MG/ML
INJECTION, SOLUTION EPIDURAL; INFILTRATION; INTRACAUDAL; PERINEURAL PRN
Status: DISCONTINUED | OUTPATIENT
Start: 2022-01-14 | End: 2022-01-14 | Stop reason: SDUPTHER

## 2022-01-14 RX ORDER — DEXAMETHASONE SODIUM PHOSPHATE 10 MG/ML
INJECTION INTRAMUSCULAR; INTRAVENOUS PRN
Status: DISCONTINUED | OUTPATIENT
Start: 2022-01-14 | End: 2022-01-14 | Stop reason: SDUPTHER

## 2022-01-14 RX ORDER — ONDANSETRON 2 MG/ML
INJECTION INTRAMUSCULAR; INTRAVENOUS PRN
Status: DISCONTINUED | OUTPATIENT
Start: 2022-01-14 | End: 2022-01-14 | Stop reason: SDUPTHER

## 2022-01-14 RX ORDER — PROPOFOL 10 MG/ML
INJECTION, EMULSION INTRAVENOUS PRN
Status: DISCONTINUED | OUTPATIENT
Start: 2022-01-14 | End: 2022-01-14 | Stop reason: SDUPTHER

## 2022-01-14 RX ADMIN — SUGAMMADEX 100 MG: 100 INJECTION, SOLUTION INTRAVENOUS at 11:12

## 2022-01-14 RX ADMIN — SODIUM CHLORIDE, POTASSIUM CHLORIDE, SODIUM LACTATE AND CALCIUM CHLORIDE: 600; 310; 30; 20 INJECTION, SOLUTION INTRAVENOUS at 10:43

## 2022-01-14 RX ADMIN — LIDOCAINE HYDROCHLORIDE 50 MG: 10 INJECTION, SOLUTION EPIDURAL; INFILTRATION; INTRACAUDAL; PERINEURAL at 10:54

## 2022-01-14 RX ADMIN — DEXAMETHASONE SODIUM PHOSPHATE 10 MG: 10 INJECTION INTRAMUSCULAR; INTRAVENOUS at 11:05

## 2022-01-14 RX ADMIN — ROCURONIUM BROMIDE 25 MG: 10 INJECTION INTRAVENOUS at 10:54

## 2022-01-14 RX ADMIN — ONDANSETRON 4 MG: 2 INJECTION INTRAMUSCULAR; INTRAVENOUS at 11:05

## 2022-01-14 RX ADMIN — PROPOFOL 100 MG: 10 INJECTION, EMULSION INTRAVENOUS at 10:54

## 2022-01-14 ASSESSMENT — PULMONARY FUNCTION TESTS
PIF_VALUE: 18
PIF_VALUE: 20
PIF_VALUE: 3
PIF_VALUE: 19
PIF_VALUE: 19
PIF_VALUE: 20
PIF_VALUE: 2
PIF_VALUE: 19
PIF_VALUE: 3
PIF_VALUE: 1
PIF_VALUE: 3
PIF_VALUE: 1
PIF_VALUE: 20
PIF_VALUE: 3
PIF_VALUE: 4
PIF_VALUE: 21
PIF_VALUE: 1
PIF_VALUE: 18
PIF_VALUE: 18
PIF_VALUE: 8
PIF_VALUE: 21
PIF_VALUE: 4
PIF_VALUE: 22
PIF_VALUE: 3
PIF_VALUE: 19
PIF_VALUE: 23
PIF_VALUE: 3
PIF_VALUE: 0
PIF_VALUE: 24
PIF_VALUE: 20
PIF_VALUE: 21
PIF_VALUE: 21
PIF_VALUE: 22
PIF_VALUE: 2
PIF_VALUE: 3
PIF_VALUE: 23
PIF_VALUE: 1

## 2022-01-14 ASSESSMENT — PAIN SCALES - GENERAL
PAINLEVEL_OUTOF10: 0

## 2022-01-14 ASSESSMENT — PAIN - FUNCTIONAL ASSESSMENT: PAIN_FUNCTIONAL_ASSESSMENT: 0-10

## 2022-01-14 NOTE — ANESTHESIA PRE PROCEDURE
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 not reported.  cough and fatigue    Hyperlipidemia     Type 2 diabetes mellitus ([address]     Wears glasses     Wears hearing aid     Wellness examination 12/2021    Dr. Octavio lEy       Past Surgical History:        Procedure Laterality Date    AORTIC VALVE REPLACEMENT  2001    age 79    CABG WITH AORTIC VALVE REPLACEMENT  2001    CORONARY ANGIOPLASTY WITH STENT PLACEMENT  2007    FRACTURE SURGERY      multiple over the years, ankle, wrist, shoulder    UPPER GASTROINTESTINAL ENDOSCOPY N/A 12/16/2021    ENDOSCOPIC ULTRASOUND performed by Mary Schwartz MD at [facility]       Social History:    Social History     Tobacco Use    Smoking status: Never Smoker    Smokeless tobacco: Never Used   Substance Use Topics    Alcohol use: Not Currently                                Counseling given: Not Answered      Vital Signs (Current):   Vitals:    01/12/22 1546 01/14/22 0948   BP:  124/62   Pulse:  88   Resp:  18   Temp:  97.2 °F (36.2 °C)   TempSrc:  Temporal   SpO2:  100%   Weight: 160 lb (72.6 kg) 160 lb (72.6 kg)   Height: 5' 6\" (1.676 m) 5' 6\" (1.676 m) BP Readings from Last 3 Encounters:   01/14/22 124/62   12/22/21 113/64   12/16/21 (!) 141/53       NPO Status: Time of last liquid consumption: 1800                        Time of last solid consumption: 1800                        Date of last liquid consumption: 01/13/22                        Date of last solid food consumption: 01/13/22    BMI:   Wt Readings from Last 3 Encounters:   01/14/22 160 lb (72.6 kg)   12/22/21 160 lb (72.6 kg)   12/16/21 160 lb (72.6 kg)     Body mass index is 25.82 kg/m².     CBC:   Lab Results   Component Value Date    WBC 8.5 12/22/2021    RBC 3.30 12/22/2021    HGB 10.5 12/22/2021    HCT 32.5 12/22/2021    MCV 98.5 12/22/2021    RDW 14.4 12/22/2021     12/22/2021       CMP:   Lab Results   Component Value Date    CREATININE 1.16 01/14/2022    LABGLOM 43 01/14/2022    PROT 7.6 12/22/2021    BILITOT 0.19 12/22/2021    ALKPHOS 97 12/22/2021    AST 14 12/22/2021    ALT 11 12/22/2021       POC Tests:   Recent Labs     01/14/22  1014   POCGLU 186*   POCBUN 31*       Coags: No results found for: PROTIME, INR, APTT    HCG (If Applicable): No results found for: PREGTESTUR, PREGSERUM, HCG, HCGQUANT     ABGs: No results found for: PHART, PO2ART, IXS7QKE, NAW7BIO, BEART, Q7KMLOMA     Type & Screen (If Applicable):  No results found for: LABABO, LABRH    Drug/Infectious Status (If Applicable):  No results found for: HIV, HEPCAB    COVID-19 Screening (If Applicable): No results found for: COVID19        Anesthesia Evaluation  Patient summary reviewed and Nursing notes reviewed no history of anesthetic complications:   Airway: Mallampati: II  TM distance: >3 FB   Neck ROM: full  Mouth opening: > = 3 FB Dental: normal exam         Pulmonary:Negative Pulmonary ROS and normal exam                               Cardiovascular:  Exercise tolerance: good (>4 METS),   (+) valvular problems/murmurs (s/p AVR):, CAD: non-obstructive, CABG/stent:, hyperlipidemia        Rhythm: regular  Rate: normal           Beta Blocker:  Not on Beta Blocker         Neuro/Psych:   Negative Neuro/Psych ROS              GI/Hepatic/Renal: Neg GI/Hepatic/Renal ROS            Endo/Other:    (+) DiabetesType II DM, , .                 Abdominal:             Vascular: Other Findings:             Anesthesia Plan      general     ASA 4     (Patient would like to continue her DNR status during the procedure )  Induction: intravenous. MIPS: Postoperative opioids intended and Prophylactic antiemetics administered. Anesthetic plan and risks discussed with patient.       Plan discussed with CRNA and surgical team.                Allie Carver MD   1/14/2022

## 2022-01-14 NOTE — OP NOTE
ERCP      Patient:   Casie Chen   :    1926  Acc#:    [id number]   Referring/PCP: Chaz Trinidad DO  Facility:   [facility]  Date:     2022   Endoscopist:  Brent Tomlinson MD, [facility]    Procedure: ERCP with  Cholangiogram, sphincterotomy and balloon sweep        Indication: CBD stone    Postprocedure diagnosis: Normal CBD    Anesthesia:  General     EBL: None from the procedure    Specimen: None    Description of Procedure:  Prior to the procedure, a history and physical exam was performed and informed consent was obtained. The risks were discussed including pancreatitis, bleeding, and perforation. After the patient was placed in the prone position eved, the therapeutic duodenoscope scope was inserted into the mouth and advanced to the second portion of the duodenum allowing the papilla to be visualized. Using the a wire guided approach with the sphincterotome, the CBD was cannulated and a cholangiogram was performed. Findings: The initial cholangiogram revealed non-dilated CBD with possible defect in distal CBD. CBD 6 mm    A 6 mm sphincterotomy was performed. The sphincterotome was exchanged, over a wire for a 12 mm above injection balloon. Multiple balloon sweeps were performed revealing Nothing. A terminal balloon occlusion cholangiongram was normal.    The duodenoscope was removed and the patient tolerated the procedure well. The pancreatic duct was not manipulated during the procedure. Plan:  Low fat diet.     Electronically signed by Brent Tomlinson MD, FACG on 2022 at 11:16 AM

## 2022-01-14 NOTE — ANESTHESIA POSTPROCEDURE EVALUATION
Department of Anesthesiology  Postprocedure Note    Patient: Kyle Garcia  MRN: [id number]  YOB: 1926  Date of evaluation: 1/14/2022  Time:  5:32 PM     Procedure Summary     Date: 01/14/22 Room / Location: [address]    Anesthesia Start: 4423 Anesthesia Stop: 5991    Procedure: ERCP SPHINCTER/PAPILLOTOMY (N/A ) Diagnosis: (NAUSEA, CONSTIPATION, BILE DUCT STONE)    Surgeons: Alesha Martinez MD Responsible Provider: Fredy García MD    Anesthesia Type: general ASA Status: 4          Anesthesia Type: general    Ruiz Phase I: Ruiz Score: 10    Ruiz Phase II: Ruiz Score: 10    Last vitals: Reviewed and per EMR flowsheets.        Anesthesia Post Evaluation    Patient location during evaluation: PACU  Patient participation: complete - patient participated  Level of consciousness: awake and alert  Pain score: 2  Airway patency: patent  Nausea & Vomiting: no nausea and no vomiting  Complications: no  Cardiovascular status: hemodynamically stable  Respiratory status: acceptable  Hydration status: euvolemic

## 2022-01-14 NOTE — INTERVAL H&P NOTE
Pt Name: Harjinder Veliz  MRN: [id number]  YOB: 1926  Date of evaluation: 1/14/2022    I have reviewed the patient's history and physical examination completed on 12/16/2021. No changes to history or on examination today, unless noted below. HR irregular on exam. Patient has history of atrial fibrillation and per RN, will have EKG today. Patient denies chest pain or shortness of breath.      KEVIN Mena - CNP  1/14/22  9:55 AM

## 2022-01-15 LAB
EKG ATRIAL RATE: 241 BPM
EKG Q-T INTERVAL: 406 MS
EKG QRS DURATION: 84 MS
EKG QTC CALCULATION (BAZETT): 459 MS
EKG R AXIS: -26 DEGREES
EKG T AXIS: 84 DEGREES
EKG VENTRICULAR RATE: 77 BPM

## (undated) DEVICE — GARMENT,MEDLINE,DVT,INT,CALF,MED, GEN2: Brand: MEDLINE

## (undated) DEVICE — SPHINCTEROTOME: Brand: DREAMTOME™ RX 44

## (undated) DEVICE — GLOVE ORTHO 8   MSG9480

## (undated) DEVICE — RETRIEVAL BALLOON CATHETER: Brand: EXTRACTOR™ PRO RX